# Patient Record
Sex: MALE | Race: WHITE | Employment: OTHER | URBAN - METROPOLITAN AREA
[De-identification: names, ages, dates, MRNs, and addresses within clinical notes are randomized per-mention and may not be internally consistent; named-entity substitution may affect disease eponyms.]

---

## 2017-05-17 ENCOUNTER — TRANSFERRED RECORDS (OUTPATIENT)
Dept: HEALTH INFORMATION MANAGEMENT | Facility: CLINIC | Age: 22
End: 2017-05-17

## 2017-05-23 ENCOUNTER — ANESTHESIA EVENT (OUTPATIENT)
Dept: SURGERY | Facility: CLINIC | Age: 22
End: 2017-05-23
Payer: MEDICAID

## 2017-05-23 ENCOUNTER — ANESTHESIA (OUTPATIENT)
Dept: SURGERY | Facility: CLINIC | Age: 22
End: 2017-05-23
Payer: MEDICAID

## 2017-05-23 ENCOUNTER — HOSPITAL ENCOUNTER (OUTPATIENT)
Facility: CLINIC | Age: 22
Discharge: HOME | End: 2017-05-23
Attending: PHYSICAL MEDICINE & REHABILITATION | Admitting: PHYSICAL MEDICINE & REHABILITATION
Payer: MEDICAID

## 2017-05-23 VITALS
DIASTOLIC BLOOD PRESSURE: 79 MMHG | HEIGHT: 64 IN | OXYGEN SATURATION: 94 % | WEIGHT: 118 LBS | BODY MASS INDEX: 20.14 KG/M2 | SYSTOLIC BLOOD PRESSURE: 114 MMHG | TEMPERATURE: 97.2 F | RESPIRATION RATE: 16 BRPM

## 2017-05-23 PROCEDURE — 71000012 ZZH RECOVERY PHASE 1 LEVEL 1 FIRST HR: Performed by: PHYSICAL MEDICINE & REHABILITATION

## 2017-05-23 PROCEDURE — 36000065 ZZH SURGERY LEVEL 4 W FLUORO 1ST 30 MIN: Performed by: PHYSICAL MEDICINE & REHABILITATION

## 2017-05-23 PROCEDURE — 25000125 ZZHC RX 250: Performed by: PHYSICAL MEDICINE & REHABILITATION

## 2017-05-23 PROCEDURE — 27210794 ZZH OR GENERAL SUPPLY STERILE: Performed by: PHYSICAL MEDICINE & REHABILITATION

## 2017-05-23 PROCEDURE — 37000009 ZZH ANESTHESIA TECHNICAL FEE, EACH ADDTL 15 MIN: Performed by: PHYSICAL MEDICINE & REHABILITATION

## 2017-05-23 PROCEDURE — 40000170 ZZH STATISTIC PRE-PROCEDURE ASSESSMENT II: Performed by: PHYSICAL MEDICINE & REHABILITATION

## 2017-05-23 PROCEDURE — 36000063 ZZH SURGERY LEVEL 4 EA 15 ADDTL MIN: Performed by: PHYSICAL MEDICINE & REHABILITATION

## 2017-05-23 PROCEDURE — 25000125 ZZHC RX 250: Performed by: ANESTHESIOLOGY

## 2017-05-23 PROCEDURE — C1772 INFUSION PUMP, PROGRAMMABLE: HCPCS | Performed by: PHYSICAL MEDICINE & REHABILITATION

## 2017-05-23 PROCEDURE — S0077 INJECTION, CLINDAMYCIN PHOSP: HCPCS | Performed by: NURSE ANESTHETIST, CERTIFIED REGISTERED

## 2017-05-23 PROCEDURE — 25000128 H RX IP 250 OP 636: Performed by: NURSE ANESTHETIST, CERTIFIED REGISTERED

## 2017-05-23 PROCEDURE — 25000128 H RX IP 250 OP 636: Performed by: PHYSICAL MEDICINE & REHABILITATION

## 2017-05-23 PROCEDURE — 25000125 ZZHC RX 250: Performed by: NURSE ANESTHETIST, CERTIFIED REGISTERED

## 2017-05-23 PROCEDURE — 37000008 ZZH ANESTHESIA TECHNICAL FEE, 1ST 30 MIN: Performed by: PHYSICAL MEDICINE & REHABILITATION

## 2017-05-23 PROCEDURE — 27810169 ZZH OR IMPLANT GENERAL: Performed by: PHYSICAL MEDICINE & REHABILITATION

## 2017-05-23 PROCEDURE — 71000027 ZZH RECOVERY PHASE 2 EACH 15 MINS: Performed by: PHYSICAL MEDICINE & REHABILITATION

## 2017-05-23 DEVICE — PUMP SYNCHROMED II 20ML 8637-20: Type: IMPLANTABLE DEVICE | Site: ABDOMEN | Status: FUNCTIONAL

## 2017-05-23 DEVICE — IMP ENVELOPE MEDT TYRX ABS ANTIBACTERIAL LG NMRM6133: Type: IMPLANTABLE DEVICE | Site: ABDOMEN | Status: FUNCTIONAL

## 2017-05-23 RX ORDER — PREDNISOLONE 15 MG/5 ML
3.3-1 SOLUTION, ORAL ORAL 2 TIMES DAILY PRN
COMMUNITY

## 2017-05-23 RX ORDER — PREDNISOLONE 15 MG/5 ML
3.3 SOLUTION, ORAL ORAL DAILY
COMMUNITY

## 2017-05-23 RX ORDER — CETIRIZINE HYDROCHLORIDE 5 MG/1
10 TABLET ORAL AT BEDTIME
COMMUNITY

## 2017-05-23 RX ORDER — ONDANSETRON 2 MG/ML
4 INJECTION INTRAMUSCULAR; INTRAVENOUS EVERY 30 MIN PRN
Status: DISCONTINUED | OUTPATIENT
Start: 2017-05-23 | End: 2017-05-23 | Stop reason: HOSPADM

## 2017-05-23 RX ORDER — MUPIROCIN 20 MG/G
OINTMENT TOPICAL 2 TIMES DAILY PRN
COMMUNITY

## 2017-05-23 RX ORDER — PREDNISOLONE 15 MG/5 ML
10-20 SOLUTION, ORAL ORAL DAILY PRN
Status: ON HOLD | COMMUNITY
End: 2017-05-23

## 2017-05-23 RX ORDER — CYPROHEPTADINE HYDROCHLORIDE 2 MG/5ML
6 SOLUTION ORAL AT BEDTIME
COMMUNITY

## 2017-05-23 RX ORDER — CYPROHEPTADINE HYDROCHLORIDE 2 MG/5ML
4 SOLUTION ORAL 2 TIMES DAILY
COMMUNITY

## 2017-05-23 RX ORDER — NYSTATIN 100000 [USP'U]/G
POWDER TOPICAL DAILY PRN
COMMUNITY

## 2017-05-23 RX ORDER — BISACODYL 10 MG
10 SUPPOSITORY, RECTAL RECTAL DAILY PRN
COMMUNITY

## 2017-05-23 RX ORDER — ONDANSETRON 4 MG/1
4 TABLET, ORALLY DISINTEGRATING ORAL EVERY 30 MIN PRN
Status: DISCONTINUED | OUTPATIENT
Start: 2017-05-23 | End: 2017-05-23 | Stop reason: HOSPADM

## 2017-05-23 RX ORDER — KETOCONAZOLE 20 MG/ML
SHAMPOO TOPICAL
COMMUNITY

## 2017-05-23 RX ORDER — GABAPENTIN 250 MG/5ML
750 SOLUTION ORAL 2 TIMES DAILY
COMMUNITY

## 2017-05-23 RX ORDER — HYDROCORTISONE 25 MG/G
OINTMENT TOPICAL 2 TIMES DAILY PRN
COMMUNITY

## 2017-05-23 RX ORDER — CLINDAMYCIN PHOSPHATE 600 MG/50ML
INJECTION, SOLUTION INTRAVENOUS PRN
Status: DISCONTINUED | OUTPATIENT
Start: 2017-05-23 | End: 2017-05-23

## 2017-05-23 RX ORDER — IBUPROFEN 100 MG/5ML
400 SUSPENSION, ORAL (FINAL DOSE FORM) ORAL EVERY 6 HOURS PRN
COMMUNITY

## 2017-05-23 RX ORDER — BENZOCAINE/MENTHOL 6 MG-10 MG
LOZENGE MUCOUS MEMBRANE 3 TIMES DAILY PRN
Status: ON HOLD | COMMUNITY
End: 2017-05-23

## 2017-05-23 RX ORDER — SODIUM CHLORIDE, SODIUM LACTATE, POTASSIUM CHLORIDE, CALCIUM CHLORIDE 600; 310; 30; 20 MG/100ML; MG/100ML; MG/100ML; MG/100ML
INJECTION, SOLUTION INTRAVENOUS CONTINUOUS PRN
Status: DISCONTINUED | OUTPATIENT
Start: 2017-05-23 | End: 2017-05-23

## 2017-05-23 RX ORDER — DIPHENHYDRAMINE HCL 25 MG
25-50 CAPSULE ORAL EVERY 6 HOURS PRN
Status: DISCONTINUED | OUTPATIENT
Start: 2017-05-23 | End: 2017-05-23 | Stop reason: HOSPADM

## 2017-05-23 RX ORDER — ACETAMINOPHEN 10 MG/ML
1000 INJECTION, SOLUTION INTRAVENOUS ONCE
Status: COMPLETED | OUTPATIENT
Start: 2017-05-23 | End: 2017-05-23

## 2017-05-23 RX ORDER — DIAZEPAM 10 MG/2G
10 GEL RECTAL
COMMUNITY

## 2017-05-23 RX ORDER — ALBUTEROL SULFATE 90 UG/1
2-4 AEROSOL, METERED RESPIRATORY (INHALATION) EVERY 4 HOURS PRN
COMMUNITY

## 2017-05-23 RX ORDER — PROCHLORPERAZINE 25 MG
25 SUPPOSITORY, RECTAL RECTAL EVERY 12 HOURS PRN
Status: DISCONTINUED | OUTPATIENT
Start: 2017-05-23 | End: 2017-05-23 | Stop reason: HOSPADM

## 2017-05-23 RX ORDER — POLYETHYLENE GLYCOL 3350 17 G/17G
17 POWDER, FOR SOLUTION ORAL DAILY
COMMUNITY

## 2017-05-23 RX ORDER — NYSTATIN 100000 U/G
OINTMENT TOPICAL 2 TIMES DAILY PRN
COMMUNITY

## 2017-05-23 RX ORDER — MAGNESIUM HYDROXIDE/ALUMINUM HYDROXICE/SIMETHICONE 120; 1200; 1200 MG/30ML; MG/30ML; MG/30ML
15 SUSPENSION ORAL 4 TIMES DAILY PRN
COMMUNITY

## 2017-05-23 RX ORDER — LIDOCAINE 40 MG/G
CREAM TOPICAL
Status: DISCONTINUED | OUTPATIENT
Start: 2017-05-23 | End: 2017-05-23 | Stop reason: HOSPADM

## 2017-05-23 RX ORDER — SODIUM CHLORIDE, SODIUM LACTATE, POTASSIUM CHLORIDE, CALCIUM CHLORIDE 600; 310; 30; 20 MG/100ML; MG/100ML; MG/100ML; MG/100ML
INJECTION, SOLUTION INTRAVENOUS CONTINUOUS
Status: DISCONTINUED | OUTPATIENT
Start: 2017-05-23 | End: 2017-05-23 | Stop reason: HOSPADM

## 2017-05-23 RX ORDER — NYSTATIN 100000 U/G
CREAM TOPICAL 2 TIMES DAILY PRN
Status: ON HOLD | COMMUNITY
End: 2017-05-23

## 2017-05-23 RX ORDER — SIMETHICONE 40MG/0.6ML
96 SUSPENSION, DROPS(FINAL DOSAGE FORM)(ML) ORAL 3 TIMES DAILY
Status: ON HOLD | COMMUNITY
End: 2017-05-23

## 2017-05-23 RX ORDER — PROCHLORPERAZINE MALEATE 5 MG
5-10 TABLET ORAL EVERY 6 HOURS PRN
Status: DISCONTINUED | OUTPATIENT
Start: 2017-05-23 | End: 2017-05-23 | Stop reason: HOSPADM

## 2017-05-23 RX ORDER — SODIUM CHLORIDE FOR INHALATION 0.9 %
3 VIAL, NEBULIZER (ML) INHALATION 3 TIMES DAILY PRN
COMMUNITY

## 2017-05-23 RX ORDER — NALOXONE HYDROCHLORIDE 0.4 MG/ML
.1-.4 INJECTION, SOLUTION INTRAMUSCULAR; INTRAVENOUS; SUBCUTANEOUS
Status: DISCONTINUED | OUTPATIENT
Start: 2017-05-23 | End: 2017-05-23 | Stop reason: HOSPADM

## 2017-05-23 RX ORDER — ETHYL ALCOHOL 700 MG/ML
GEL TOPICAL
COMMUNITY

## 2017-05-23 RX ORDER — GABAPENTIN 250 MG/5ML
900 SOLUTION ORAL AT BEDTIME
COMMUNITY

## 2017-05-23 RX ORDER — METOCLOPRAMIDE HYDROCHLORIDE 5 MG/ML
10 INJECTION INTRAMUSCULAR; INTRAVENOUS EVERY 6 HOURS
Status: DISCONTINUED | OUTPATIENT
Start: 2017-05-23 | End: 2017-05-23 | Stop reason: HOSPADM

## 2017-05-23 RX ORDER — DIPHENHYDRAMINE HYDROCHLORIDE 50 MG/ML
25-50 INJECTION INTRAMUSCULAR; INTRAVENOUS EVERY 6 HOURS PRN
Status: DISCONTINUED | OUTPATIENT
Start: 2017-05-23 | End: 2017-05-23 | Stop reason: HOSPADM

## 2017-05-23 RX ORDER — ALBUTEROL SULFATE 0.83 MG/ML
1 SOLUTION RESPIRATORY (INHALATION) 2 TIMES DAILY
Status: ON HOLD | COMMUNITY
End: 2017-05-23

## 2017-05-23 RX ORDER — ALBUTEROL SULFATE 0.83 MG/ML
1 SOLUTION RESPIRATORY (INHALATION)
COMMUNITY

## 2017-05-23 RX ADMIN — DEXMEDETOMIDINE HYDROCHLORIDE 4 MCG: 100 INJECTION, SOLUTION INTRAVENOUS at 12:32

## 2017-05-23 RX ADMIN — DEXMEDETOMIDINE HYDROCHLORIDE 4 MCG: 100 INJECTION, SOLUTION INTRAVENOUS at 12:25

## 2017-05-23 RX ADMIN — DEXMEDETOMIDINE HYDROCHLORIDE 4 MCG: 100 INJECTION, SOLUTION INTRAVENOUS at 12:51

## 2017-05-23 RX ADMIN — SODIUM CHLORIDE, POTASSIUM CHLORIDE, SODIUM LACTATE AND CALCIUM CHLORIDE: 600; 310; 30; 20 INJECTION, SOLUTION INTRAVENOUS at 12:15

## 2017-05-23 RX ADMIN — CLINDAMYCIN PHOSPHATE 600 MG: 12 INJECTION, SOLUTION INTRAVENOUS at 12:27

## 2017-05-23 RX ADMIN — ACETAMINOPHEN 1000 MG: 10 INJECTION, SOLUTION INTRAVENOUS at 13:35

## 2017-05-23 NOTE — IP AVS SNAPSHOT
MRN:[id number]                      After Visit Summary   5/23/2017    Uziel Rashid    MRN: [id number]           Thank you!     Thank you for choosing [facility] for your care. Our goal is always to provide you with excellent care. Hearing back from our patients is one way we can continue to improve our services. Please take a few minutes to complete the written survey that you may receive in the mail after you visit with us. Thank you!        Patient Information     Date Of Birth          1995        About your hospital stay     You were admitted on:  May 23, 2017 You last received care in the:  [facility] Same Day Surgery    You were discharged on:  May 23, 2017       Who to Call     For medical emergencies, please call 911.  For non-urgent questions about your medical care, please call your primary care provider or clinic, [phone]  For questions related to your surgery, please call your surgery clinic        Attending Provider     Provider Trip    Will, Jim ANDERSON MD [facility]       Primary Care Provider Office Phone # Fax #    [facility] [phone]       [address] [place] [address]  [facility] [id number]        Further instructions from your care team       Same Day Surgery Discharge Instructions for  Sedation and General Anesthesia       It's not unusual to feel dizzy, light-headed or faint for up to 24 hours after surgery or while taking pain medication.  If you have these symptoms: sit for a few minutes before standing and have someone assist you when you get up to walk or use the bathroom.      You should rest and relax for the next 24 hours. We recommend you make arrangements to have an adult stay with you for at least 24 hours after your discharge.  Avoid hazardous and strenuous activity.      DO NOT DRIVE any vehicle or operate mechanical equipment for 24 hours following the end of your surgery.  Even though you may feel normal, your  reactions may be affected by the medication you have received.      Do not drink alcoholic beverages for 24 hours following surgery.       Slowly progress to your regular diet as you feel able. It's not unusual to feel nauseated and/or vomit after receiving anesthesia.  If you develop these symptoms, drink clear liquids (apple juice, ginger ale, broth, 7-up, etc. ) until you feel better.  If your nausea and vomiting persists for 24 hours, please notify your surgeon.        All narcotic pain medications, along with inactivity and anesthesia, can cause constipation. Drinking plenty of liquids and increasing fiber intake will help.      For any questions of a medical nature, call your surgeon.      Do not make important decisions for 24 hours.      If you had general anesthesia, you may have a sore throat for a couple of days related to the breathing tube used during surgery.  You may use Cepacol lozenges to help with this discomfort.  If it worsens or if you develop a fever, contact your surgeon.       If you feel your pain is not well managed with the pain medications prescribed by your surgeon, please contact your surgeon's office to let them know so they can address your concerns.           [facility]  Opioid Pain Pump Implant  Discharge Instructions    Diet and Medications   * Continue with your regular diet   * Resume your regular pain medication as written in your medication discharge sheet   * You may notice a need to decrease your pain medications   * You will be taking an oral antibiotic (Keflex) for 10 days after the Implant   * DO NOT drink alcoholic beverage    Activity and Lead Site Care   * Please resume light activities as tolerated    Site Care   * Check your procedure site daily.  Keep sites clean and dry.  Leave the dressings in place.   * No shower, tub bath, swimming or whirlpools while catheter in place.  Sponge bath only.    Call Dr. Nye if you develop   *  Sign of an infection:  "fevers, chills, increased pain, drainage, redness and swelling from the insertion site   *  Headache persisting for more than 48 hours   * Unusual changes in or stopping of sensation   * Painful sensations:  Call the Doctor's Office [phone] or call Dr. Nye on his cell phone after hours [phone]    Emergency situations-Go to the Emergency Department or call 911:   *  Sudden severe back pain   *  Sudden onset of leg weakness and spasms   *  Loss of bladder control and/or bowel function    ___ [facility] [phone]  ___ [facility] [phone]    Pending Results     No orders found from 2017 to 2017.            Admission Information     Date & Time Provider Department Dept. Phone    2017 Jim Nye MD [facility] Same Day Surgery [phone]      Your Vitals Were     Blood Pressure Temperature Respirations Height Weight Pulse Oximetry    114/76 97.2  F (36.2  C) (Temporal) 16 1.626 m (5' 4\") 53.5 kg (118 lb) 95%    BMI (Body Mass Index)                   20.25 kg/m2           MyChart Information     [facility] lets you send messages to your doctor, view your test results, renew your prescriptions, schedule appointments and more. To sign up, go to [email or web address] Click on \"Log in\" on the left side of the screen, which will take you to the Welcome page. Then click on \"Sign up Now\" on the right side of the page.     You will be asked to enter the access code listed below, as well as some personal information. Please follow the directions to create your username and password.     Your access code is: [email or web address]  Expires: 2017  1:39 PM     Your access code will  in 90 days. If you need help or a new code, please call your [facility] clinic or [phone].        Care EveryWhere ID     This is your Care EveryWhere ID. This could be used by other organizations to access your [facility] medical records  [id number]           Review of " your medicines      UNREVIEWED medicines. Ask your doctor about these medicines        Dose / Directions    acetaminophen 32 mg/mL solution   Commonly known as:  TYLENOL        Dose:  576 mg   576 mg by Per G Tube route every 4 hours as needed for fever or mild pain   Refills:  0       ACIDOPHILUS LACTOBACILLUS PO        Dose:  1 tablet   1 tablet by Gastric Tube route 2 times daily   Refills:  0       * albuterol (2.5 MG/3ML) 0.083% neb solution        Dose:  1 vial   Take 1 vial by nebulization every 3 hours as needed for shortness of breath / dyspnea or wheezing   Refills:  0       * albuterol 108 (90 BASE) MCG/ACT Inhaler   Commonly known as:  PROAIR HFA/PROVENTIL HFA/VENTOLIN HFA        Dose:  2-4 puff   Inhale 2-4 puffs into the lungs every 4 hours as needed for shortness of breath / dyspnea or wheezing   Refills:  0       alum & mag hydroxide-simethicone 200-200-20 MG/5ML Susp suspension   Commonly known as:  MYLANTA/MAALOX        Dose:  15 mL   15 mLs by Per G Tube route 4 times daily as needed (GI Distress)   Refills:  0       * BACLOFEN IN INTERNAL INTRATHECAL PUMP        by Intrathecal route continuous prn 599.2 UG/ 24 Hours   Refills:  0       * BACLOFEN PO        Dose:  20 mg   20 mg by Gastric Tube route every 6 hours as needed (If Baclofen Pump Fails)   Refills:  0       beclomethasone 40 MCG/ACT Inhaler   Commonly known as:  QVAR        Dose:  1 puff   Inhale 1 puff into the lungs 2 times daily   Refills:  0       * BISACODYL PO        Dose:  20 mg   20 mg by Per J Tube route daily as needed (4 x 5 mg = 20 mg dose)   Refills:  0       * bisacodyl 10 MG Suppository   Commonly known as:  DULCOLAX        Dose:  10 mg   Place 10 mg rectally daily as needed for constipation   Refills:  0       BROMOCRIPTINE MESYLATE PO        Dose:  5 mg   5 mg by Gastric Tube route 3 times daily (2 x 2.5 mg = 5 mg dose)   Refills:  0       CENTRUM KIDS COMPLETE PO        Dose:  1 chew tab   1 chew tab by Gastric Tube  route daily   Refills:  0       Cetirizine HCl 5 MG/5ML Soln        Dose:  10 mL   10 mLs by Gastric Tube route At Bedtime   Refills:  0       * cyproheptadine 2 MG/5ML syrup        Dose:  4 mg   Take 4 mg by mouth 2 times daily 0900 and 1400   Refills:  0       * cyproheptadine 2 MG/5ML syrup        Dose:  6 mg   Take 6 mg by mouth At Bedtime   Refills:  0       diazepam 10 MG Gel rectal kit   Commonly known as:  DIASTAT ACUDIAL        Dose:  10 mg   Place 10 mg rectally once as needed for seizures   Refills:  0       docusate sodium 283 MG enema   Commonly known as:  ENEMEEZ        Dose:  1-2 enema   Place 1-2 enemas rectally daily as needed for constipation   Refills:  0       EASY FIBER PO        Dose:  2 tsp.   2 tsp. by Per G Tube route daily (with 300 ml water)   Refills:  0       ergocalciferol 8000 UNIT/ML drops   Commonly known as:  DRISDOL        Dose:  8000 Units   8,000 Units by Per G Tube route once a week (Monday)   Refills:  0       EYE LUBRICANT Oint        Place into both eyes every 3 hours as needed (Dry Eyes)   Refills:  0       FLEET ENEMA RE        Place rectally daily as needed (if Mini Doesn't work)   Refills:  0       * gabapentin 250 MG/5ML solution   Commonly known as:  NEURONTIN        Dose:  750 mg   750 mg by Per G Tube route 2 times daily At 0900 and 1400   Refills:  0       * gabapentin 250 MG/5ML solution   Commonly known as:  NEURONTIN        Dose:  900 mg   Take 900 mg by mouth At Bedtime At 2000   Refills:  0       guaiFENesin 100 MG/5ML Syrp   Commonly known as:  ROBITUSSIN        Dose:  5 mL   Take 5 mLs by mouth 4 times daily as needed for cough   Refills:  0       hydrocortisone 2.5 % ointment        Apply topically 2 times daily as needed for rash   Refills:  0       ibuprofen 100 MG/5ML suspension   Commonly known as:  ADVIL/MOTRIN        Dose:  400 mg   Take 400 mg by mouth every 6 hours as needed for pain (Discomfort)   Refills:  0       ketoconazole 2 % shampoo   Commonly  known as:  NIZORAL        Apply topically every 72 hours as needed for itching or irritation   Refills:  0       LAMOTRIGINE PO        Dose:  300 mg   300 mg by Gastric Tube route 2 times daily (2 x 150 mg tablet = 300 mg dose)   Refills:  0       LANSOPRAZOLE PO        Dose:  30 mg   30 mg by Gastric Tube route 2 times daily   Refills:  0       methylPREDNISolone sodium succinate 40 mg/mL   Commonly known as:  solu-MEDROL        Dose:  40 mg   Inject 40 mg into the vein daily as needed (stress dosing)   Refills:  0       mupirocin 2 % ointment   Commonly known as:  BACTROBAN        Apply topically 2 times daily as needed Apply and gently massage into affected area around G-Tube site   Refills:  0       * NONFORMULARY        Dose:  1.2 mL   1.2 mLs by Gastric Tube route 3 times daily SIMETHICONE 80mcg/ml   Refills:  0       * NONFORMULARY        Dose:  30 mL   30 mLs by Bladder Instillation route 2 times daily as needed GENTAMICIN BLADDER IRRIGATION SOLUTION 480mg/1ml 1 L Normal Saline Cath into empty bladder first.  Instill 30 ml Gentamicin solution into bladder.  Leave in bladder until next cath.   Refills:  0       * nystatin ointment   Commonly known as:  MYCOSTATIN        Apply topically 2 times daily as needed (Rash)   Refills:  0       * nystatin 251153 UNIT/GM Powd   Commonly known as:  MYCOSTATIN        Apply topically daily as needed   Refills:  0       polyethylene glycol powder   Commonly known as:  MIRALAX/GLYCOLAX        Dose:  17 g   17 g by Per G Tube route daily (mix with liquid)   Refills:  0       * prednisoLONE 15 MG/5ML solution   Commonly known as:  ORAPRED/PRELONE        Dose:  3.3 mL   3.3 mLs by Gastric Tube route daily   Refills:  0       * prednisoLONE 15 MG/5ML solution   Commonly known as:  ORAPRED/PRELONE        Dose:  3.3-10 mL   Take 3.3-10 mLs by mouth 2 times daily as needed (mild to sevre stress) for 1-5 days -Red Zone of Resp Control Plan   Refills:  0       PULMICORT IN         Inhale into the lungs daily as needed   Refills:  0       sennosides 8.8 MG/5ML syrup   Commonly known as:  SENOKOT        Dose:  17.6 mg   17.6 mg by Gastric Tube route See Admin Instructions Twice daily until regular, then once daily, hold if loose   Refills:  0       sodium chloride 0.9 % neb solution        Dose:  3 mL   Take 3 mLs by nebulization 3 times daily as needed for wheezing   Refills:  0       SODIUM CHLORIDE NA        Dose:  2 spray   Spray 2 sprays in nostril 2 times daily   Refills:  0       * TOPIRAMATE PO        Dose:  150 mg   150 mg by Gastric Tube route every morning (1.5 x 100 mg tablet = 150 mg dose)   Refills:  0       * TOPIRAMATE PO        Dose:  200 mg   200 mg by Gastric Tube route At Bedtime (2 x 100 mg tablet = 200 mg dose)   Refills:  0       * Notice:  This list has 18 medication(s) that are the same as other medications prescribed for you. Read the directions carefully, and ask your doctor or other care provider to review them with you.             Protect others around you: Learn how to safely use, store and throw away your medicines at www.disposemymeds.org.             Medication List: This is a list of all your medications and when to take them. Check marks below indicate your daily home schedule. Keep this list as a reference.      Medications           Morning Afternoon Evening Bedtime As Needed    acetaminophen 32 mg/mL solution   Commonly known as:  TYLENOL   576 mg by Per G Tube route every 4 hours as needed for fever or mild pain                                ACIDOPHILUS LACTOBACILLUS PO   1 tablet by Gastric Tube route 2 times daily                                * albuterol (2.5 MG/3ML) 0.083% neb solution   Take 1 vial by nebulization every 3 hours as needed for shortness of breath / dyspnea or wheezing                                * albuterol 108 (90 BASE) MCG/ACT Inhaler   Commonly known as:  PROAIR HFA/PROVENTIL HFA/VENTOLIN HFA   Inhale 2-4 puffs into the lungs  every 4 hours as needed for shortness of breath / dyspnea or wheezing                                alum & mag hydroxide-simethicone 200-200-20 MG/5ML Susp suspension   Commonly known as:  MYLANTA/MAALOX   15 mLs by Per G Tube route 4 times daily as needed (GI Distress)                                * BACLOFEN IN INTERNAL INTRATHECAL PUMP   by Intrathecal route continuous prn 599.2 UG/ 24 Hours                                * BACLOFEN PO   20 mg by Gastric Tube route every 6 hours as needed (If Baclofen Pump Fails)                                beclomethasone 40 MCG/ACT Inhaler   Commonly known as:  QVAR   Inhale 1 puff into the lungs 2 times daily                                * BISACODYL PO   20 mg by Per J Tube route daily as needed (4 x 5 mg = 20 mg dose)                                * bisacodyl 10 MG Suppository   Commonly known as:  DULCOLAX   Place 10 mg rectally daily as needed for constipation                                BROMOCRIPTINE MESYLATE PO   5 mg by Gastric Tube route 3 times daily (2 x 2.5 mg = 5 mg dose)                                CENTRUM KIDS COMPLETE PO   1 chew tab by Gastric Tube route daily                                Cetirizine HCl 5 MG/5ML Soln   10 mLs by Gastric Tube route At Bedtime                                * cyproheptadine 2 MG/5ML syrup   Take 4 mg by mouth 2 times daily 0900 and 1400                                * cyproheptadine 2 MG/5ML syrup   Take 6 mg by mouth At Bedtime                                diazepam 10 MG Gel rectal kit   Commonly known as:  DIASTAT ACUDIAL   Place 10 mg rectally once as needed for seizures                                docusate sodium 283 MG enema   Commonly known as:  ENEMEEZ   Place 1-2 enemas rectally daily as needed for constipation                                EASY FIBER PO   2 tsp. by Per G Tube route daily (with 300 ml water)                                ergocalciferol 8000 UNIT/ML drops   Commonly known as:  DRISDOL    8,000 Units by Per G Tube route once a week (Monday)                                EYE LUBRICANT Oint   Place into both eyes every 3 hours as needed (Dry Eyes)                                FLEET ENEMA RE   Place rectally daily as needed (if Mini Doesn't work)                                * gabapentin 250 MG/5ML solution   Commonly known as:  NEURONTIN   750 mg by Per G Tube route 2 times daily At 0900 and 1400                                * gabapentin 250 MG/5ML solution   Commonly known as:  NEURONTIN   Take 900 mg by mouth At Bedtime At 2000                                guaiFENesin 100 MG/5ML Syrp   Commonly known as:  ROBITUSSIN   Take 5 mLs by mouth 4 times daily as needed for cough                                hydrocortisone 2.5 % ointment   Apply topically 2 times daily as needed for rash                                ibuprofen 100 MG/5ML suspension   Commonly known as:  ADVIL/MOTRIN   Take 400 mg by mouth every 6 hours as needed for pain (Discomfort)                                ketoconazole 2 % shampoo   Commonly known as:  NIZORAL   Apply topically every 72 hours as needed for itching or irritation                                LAMOTRIGINE PO   300 mg by Gastric Tube route 2 times daily (2 x 150 mg tablet = 300 mg dose)                                LANSOPRAZOLE PO   30 mg by Gastric Tube route 2 times daily                                methylPREDNISolone sodium succinate 40 mg/mL   Commonly known as:  solu-MEDROL   Inject 40 mg into the vein daily as needed (stress dosing)                                mupirocin 2 % ointment   Commonly known as:  BACTROBAN   Apply topically 2 times daily as needed Apply and gently massage into affected area around G-Tube site                                * NONFORMULARY   1.2 mLs by Gastric Tube route 3 times daily SIMETHICONE 80mcg/ml                                * NONFORMULARY   30 mLs by Bladder Instillation route 2 times daily as needed  GENTAMICIN BLADDER IRRIGATION SOLUTION 480mg/1ml 1 L Normal Saline Cath into empty bladder first.  Instill 30 ml Gentamicin solution into bladder.  Leave in bladder until next cath.                                * nystatin ointment   Commonly known as:  MYCOSTATIN   Apply topically 2 times daily as needed (Rash)                                * nystatin 155248 UNIT/GM Powd   Commonly known as:  MYCOSTATIN   Apply topically daily as needed                                polyethylene glycol powder   Commonly known as:  MIRALAX/GLYCOLAX   17 g by Per G Tube route daily (mix with liquid)                                * prednisoLONE 15 MG/5ML solution   Commonly known as:  ORAPRED/PRELONE   3.3 mLs by Gastric Tube route daily                                * prednisoLONE 15 MG/5ML solution   Commonly known as:  ORAPRED/PRELONE   Take 3.3-10 mLs by mouth 2 times daily as needed (mild to sevre stress) for 1-5 days -Red Zone of Resp Control Plan                                PULMICORT IN   Inhale into the lungs daily as needed                                sennosides 8.8 MG/5ML syrup   Commonly known as:  SENOKOT   17.6 mg by Gastric Tube route See Admin Instructions Twice daily until regular, then once daily, hold if loose                                sodium chloride 0.9 % neb solution   Take 3 mLs by nebulization 3 times daily as needed for wheezing                                SODIUM CHLORIDE NA   Spray 2 sprays in nostril 2 times daily                                * TOPIRAMATE PO   150 mg by Gastric Tube route every morning (1.5 x 100 mg tablet = 150 mg dose)                                * TOPIRAMATE PO   200 mg by Gastric Tube route At Bedtime (2 x 100 mg tablet = 200 mg dose)                                * Notice:  This list has 18 medication(s) that are the same as other medications prescribed for you. Read the directions carefully, and ask your doctor or other care provider to review them with you.

## 2017-05-23 NOTE — ANESTHESIA POSTPROCEDURE EVALUATION
Patient: Uziel Rashid    Procedure(s):  PAIN PUMP REPLACEMENT (WITH MEDTRONIC) - Wound Class: I-Clean    Diagnosis:SPASTIC QUADRAPALEGIC CEREBRAL PALSY  Diagnosis Additional Information: No value filed.    Anesthesia Type:  MAC    Note:  Anesthesia Post Evaluation    Patient location during evaluation: PACU  Patient participation: Able to fully participate in evaluation  Level of consciousness: awake  Pain management: adequate  Airway patency: patent  Cardiovascular status: acceptable  Respiratory status: acceptable  Hydration status: acceptable  PONV: none     Anesthetic complications: None          Last vitals:  Vitals:    05/23/17 1345 05/23/17 1400 05/23/17 1415   BP: 118/80 114/76 114/79   Resp: 14 16 16   Temp:      SpO2: 97% 95% 94%         Electronically Signed By: Grant Castro MD  May 23, 2017  3:20 PM

## 2017-05-23 NOTE — IP AVS SNAPSHOT
[facility] Day [facility]    [address]    [place] [address]    Phone:  [phone]    Fax:  [phone]                                       After Visit Summary   5/23/2017    Uziel Rashid    MRN: [id number]           After Visit Summary Signature Page     I have received my discharge instructions, and my questions have been answered. I have discussed any challenges I see with this plan with the nurse or doctor.    ..........................................................................................................................................  Patient/Patient Representative Signature      ..........................................................................................................................................  Patient Representative Print Name and Relationship to Patient    ..................................................               ................................................  Date                                            Time    ..........................................................................................................................................  Reviewed by Signature/Title    ...................................................              ..............................................  Date                                                            Time

## 2017-05-23 NOTE — DISCHARGE INSTRUCTIONS
Same Day Surgery Discharge Instructions for  Sedation and General Anesthesia       It's not unusual to feel dizzy, light-headed or faint for up to 24 hours after surgery or while taking pain medication.  If you have these symptoms: sit for a few minutes before standing and have someone assist you when you get up to walk or use the bathroom.      You should rest and relax for the next 24 hours. We recommend you make arrangements to have an adult stay with you for at least 24 hours after your discharge.  Avoid hazardous and strenuous activity.      DO NOT DRIVE any vehicle or operate mechanical equipment for 24 hours following the end of your surgery.  Even though you may feel normal, your reactions may be affected by the medication you have received.      Do not drink alcoholic beverages for 24 hours following surgery.       Slowly progress to your regular diet as you feel able. It's not unusual to feel nauseated and/or vomit after receiving anesthesia.  If you develop these symptoms, drink clear liquids (apple juice, ginger ale, broth, 7-up, etc. ) until you feel better.  If your nausea and vomiting persists for 24 hours, please notify your surgeon.        All narcotic pain medications, along with inactivity and anesthesia, can cause constipation. Drinking plenty of liquids and increasing fiber intake will help.      For any questions of a medical nature, call your surgeon.      Do not make important decisions for 24 hours.      If you had general anesthesia, you may have a sore throat for a couple of days related to the breathing tube used during surgery.  You may use Cepacol lozenges to help with this discomfort.  If it worsens or if you develop a fever, contact your surgeon.       If you feel your pain is not well managed with the pain medications prescribed by your surgeon, please contact your surgeon's office to let them know so they can address your concerns.           [facility]  Opioid Pain Pump  Implant  Discharge Instructions    Diet and Medications   * Continue with your regular diet   * Resume your regular pain medication as written in your medication discharge sheet   * You may notice a need to decrease your pain medications   * You will be taking an oral antibiotic (Keflex) for 10 days after the Implant   * DO NOT drink alcoholic beverage    Activity and Lead Site Care   * Please resume light activities as tolerated    Site Care   * Check your procedure site daily.  Keep sites clean and dry.  Leave the dressings in place.   * No shower, tub bath, swimming or whirlpools while catheter in place.  Sponge bath only.    Call Dr. Nye if you develop   *  Sign of an infection: fevers, chills, increased pain, drainage, redness and swelling from the insertion site   *  Headache persisting for more than 48 hours   * Unusual changes in or stopping of sensation   * Painful sensations:  Call the Doctor's Office [phone] or call Dr. Nye on his cell phone after hours [phone]    Emergency situations-Go to the Emergency Department or call 911:   *  Sudden severe back pain   *  Sudden onset of leg weakness and spasms   *  Loss of bladder control and/or bowel function    ___ [facility] [phone]  ___ [facility] [phone]

## 2017-05-23 NOTE — ANESTHESIA PREPROCEDURE EVALUATION
Anesthesia Evaluation     . Pt has had prior anesthetic. Type: General    No history of anesthetic complications          ROS/MED HX    ENT/Pulmonary:       Neurologic: Comment: Spastic quadriplegia  Anoxic brain injury  Non communicative   Shunt  Seizures    (+)other neuro     Cardiovascular:  - neg cardiovascular ROS       METS/Exercise Tolerance:     Hematologic:         Musculoskeletal:         GI/Hepatic: Comment: S/p nissen fundoplication  G tube    (+) GERD Asymptomatic on medication,       Renal/Genitourinary:      (-) renal disease   Endo:         Psychiatric:         Infectious Disease:         Malignancy:         Other:                     Physical Exam      Airway   Mallampati: II  TM distance: >3 FB  Neck ROM: limited    Dental   (+) missing    Cardiovascular   Rhythm and rate: regular and normal      Pulmonary    breath sounds clear to auscultation                    Anesthesia Plan      History & Physical Review  History and physical reviewed and following examination; no interval change.    ASA Status:  4 .        Plan for MAC Reason for MAC:  Other - see comments  PONV prophylaxis:  Ondansetron (or other 5HT-3)       Postoperative Care  Postoperative pain management:  IV analgesics.      Consents  Anesthetic plan, risks, benefits and alternatives discussed with:  Patient, legal guardian and Parent (Mother and/or Father)..                          .

## 2017-05-23 NOTE — ANESTHESIA CARE TRANSFER NOTE
Patient: Uziel Rashid    Procedure(s):  PAIN PUMP REPLACEMENT (WITH MEDTRONIC) - Wound Class: I-Clean    Diagnosis: SPASTIC QUADRAPALEGIC CEREBRAL PALSY  Diagnosis Additional Information: No value filed.    Anesthesia Type:   No value filed.     Note:  Airway :Face Mask  Patient transferred to:PACU  Comments: Patient to PACU with O2. Vitals stable Report given to RN.       Vitals: (Last set prior to Anesthesia Care Transfer)    CRNA VITALS  5/23/2017 1254 - 5/23/2017 1332      5/23/2017             Resp Rate (set): 10                Electronically Signed By: DIMITRIOS Ribeiro CRNA  May 23, 2017  1:32 PM

## 2017-05-23 NOTE — PROGRESS NOTES
Admission medication history interview status for the 5/23/2017  admission is complete. See EPIC admission navigator for prior to admission medications     Medication history source reliability:Good    Medication history interview source(s):Caregiver    Medication history resources (including written lists, pill bottles, clinic record):Dad brought a MAR with him    Primary pharmacy.[facility]    Additional medication history information not noted on PTA med list :None    Time spent in this activity: 180 minutes    Prior to Admission medications    Medication Sig Last Dose Taking? Auth Provider   alum & mag hydroxide-simethicone (MYLANTA/MAALOX) 200-200-20 MG/5ML SUSP suspension 15 mLs by Per G Tube route 4 times daily as needed (GI Distress) More than a Month at PRN Yes Reported, Patient   Sodium Phosphates (FLEET ENEMA RE) Place rectally daily as needed (if Mini Doesn't work) More than a month at PRN Yes Reported, Patient   [place] Dextrin (EASY FIBER PO) 2 tsp. by Per G Tube route daily (with 300 ml water) 5/22/2017 at 0900 Yes Reported, Patient   ergocalciferol (DRISDOL) 8000 UNIT/ML drops 8,000 Units by Per G Tube route once a week (Monday) 5/22/2017 at Unknown time Yes Reported, Patient   sodium chloride 0.9 % neb solution Take 3 mLs by nebulization 3 times daily as needed for wheezing 5/22/2017 at PM Yes Reported, Patient   acetaminophen (TYLENOL) 32 mg/mL solution 576 mg by Per G Tube route every 4 hours as needed for fever or mild pain 5/16/2017 at 1600 Yes Reported, Patient   albuterol (2.5 MG/3ML) 0.083% neb solution Take 1 vial by nebulization every 3 hours as needed for shortness of breath / dyspnea or wheezing 5/23/2017 at AM Yes Reported, Patient   beclomethasone (QVAR) 40 MCG/ACT Inhaler Inhale 1 puff into the lungs 2 times daily 5/22/2017 at PM Yes Reported, Patient   BISACODYL PO 20 mg by Per J Tube route daily as needed (4 x 5 mg = 20 mg dose) 5/8/2017 at PRN Yes Reported, Patient   BROMOCRIPTINE  MESYLATE PO 5 mg by Gastric Tube route 3 times daily (2 x 2.5 mg = 5 mg dose) 5/23/2017 at AM Yes Reported, Patient   Cetirizine HCl 5 MG/5ML SOLN 10 mLs by Gastric Tube route At Bedtime 5/22/2017 at 2000 Yes Reported, Patient   cyproheptadine 2 MG/5ML syrup Take 4 mg by mouth 2 times daily 0900 and 1400 5/22/2017 at 1400 Yes Reported, Patient   cyproheptadine 2 MG/5ML syrup Take 6 mg by mouth At Bedtime 5/22/2017 at 2000 Yes Reported, Patient   diazepam (DIASTAT ACUDIAL) 10 MG GEL rectal kit Place 10 mg rectally once as needed for seizures More than a Month at PRN Yes Reported, Patient   docusate sodium (ENEMEEZ) 283 MG enema Place 1-2 enemas rectally daily as needed for constipation 5/22/2017 at 1530 Yes Reported, Patient   gabapentin (NEURONTIN) 250 MG/5ML solution 750 mg by Per G Tube route 2 times daily At 0900 and 1400 5/23/2017 at AM Yes Reported, Patient   gabapentin (NEURONTIN) 250 MG/5ML solution Take 900 mg by mouth At Bedtime At 2000 5/22/2017 at 2000 Yes Reported, Patient   ibuprofen (ADVIL/MOTRIN) 100 MG/5ML suspension Take 400 mg by mouth every 6 hours as needed for pain (Discomfort) More than a Month at PRN Yes Reported, Patient   ketoconazole (NIZORAL) 2 % shampoo Apply topically every 72 hours as needed for itching or irritation More than a Month at PRN Yes Reported, Patient   ACIDOPHILUS LACTOBACILLUS PO 1 tablet by Gastric Tube route 2 times daily 5/22/2017 at 2000 Yes Reported, Patient   LAMOTRIGINE  mg by Gastric Tube route 2 times daily (2 x 150 mg tablet = 300 mg dose) 5/23/2017 at AM Yes Reported, Patient   methylPREDNISolone sodium succinate (SOLU-MEDROL) 40 mg/mL Inject 40 mg into the vein daily as needed (stress dosing) More than a Month at PRN Yes Reported, Patient   LANSOPRAZOLE PO 30 mg by Gastric Tube route 2 times daily 5/22/2017 at 2000 Yes Reported, Patient   mupirocin (BACTROBAN) 2 % ointment Apply topically 2 times daily as needed Apply and gently massage into affected area  around G-Tube site  More than a Month at PRN Yes Reported, Patient   Artificial Tear Ointment (EYE LUBRICANT) OINT Place into both eyes every 3 hours as needed (Dry Eyes) More than a month at PRN Yes Reported, Patient   polyethylene glycol (MIRALAX/GLYCOLAX) powder 17 g by Per G Tube route daily (mix with liquid) 5/22/2017 at 1400 Yes Reported, Patient   prednisoLONE (ORAPRED/PRELONE) 15 MG/5ML solution 3.3 mLs by Gastric Tube route daily  5/23/2017 at AM Yes Reported, Patient   sennosides (SENOKOT) 8.8 MG/5ML syrup 17.6 mg by Gastric Tube route See Admin Instructions Twice daily until regular, then once daily, hold if loose 5/22/2017 at 1600 Yes Reported, Patient   Saline (SODIUM CHLORIDE NA) Spray 2 sprays in nostril 2 times daily 5/22/2017 at PM Yes Reported, Patient   TOPIRAMATE  mg by Gastric Tube route every morning (1.5 x 100 mg tablet = 150 mg dose) 5/23/2017 at AM Yes Reported, Patient   TOPIRAMATE  mg by Gastric Tube route At Bedtime (2 x 100 mg tablet = 200 mg dose) 5/22/2017 at 2000 Yes Reported, Patient   baclofen (LIORESAL) intraTHECAL Internal Pump by Intrathecal route continuous prn 599.2 UG/ 24 Hours Continuous Yes Reported, Patient   bisacodyl (DULCOLAX) 10 MG Suppository Place 10 mg rectally daily as needed for constipation 5/22/2017 at 1530 Yes Reported, Patient   BACLOFEN PO 20 mg by Gastric Tube route every 6 hours as needed (If Baclofen Pump Fails) More than a Month at PRN Yes Reported, Patient   guaiFENesin (ROBITUSSIN) 100 MG/5ML SYRP Take 5 mLs by mouth 4 times daily as needed for cough 5/16/2017 at PRN Yes Reported, Patient   hydrocortisone 2.5 % ointment Apply topically 2 times daily as needed for rash [place] santos a Month at PRN Yes Reported, Patient   Pediatric Multivit-Minerals-C (CENTRUM KIDS COMPLETE PO) 1 chew tab by Gastric Tube route daily 5/22/2017 at 0900 Yes Reported, Patient   nystatin (MYCOSTATIN) ointment Apply topically 2 times daily as needed (Rash) More than a  Month at PRN Yes Reported, Patient   NONFORMULARY 1.2 mLs by Gastric Tube route 3 times daily SIMETHICONE 80mcg/ml 5/22/2017 at 2000 Yes Reported, Patient   nystatin (MYCOSTATIN) 859535 UNIT/GM POWD Apply topically daily as needed 5/22/2017 at PRN Yes Reported, Patient   Budesonide (PULMICORT IN) Inhale into the lungs daily as needed More than a month at PRN Yes Reported, Patient   albuterol (PROAIR HFA/PROVENTIL HFA/VENTOLIN HFA) 108 (90 BASE) MCG/ACT Inhaler Inhale 2-4 puffs into the lungs every 4 hours as needed for shortness of breath / dyspnea or wheezing 5/22/2017 at PRN Yes Reported, Patient   prednisoLONE (ORAPRED/PRELONE) 15 MG/5ML solution Take 3.3-10 mLs by mouth 2 times daily as needed (mild to sevre stress) for 1-5 days -Red Zone of Resp Control Plan 5/22/2017 at 2000 Yes Reported, Patient   NONFORMULARY 30 mLs by Bladder Instillation route 2 times daily as needed GENTAMICIN BLADDER IRRIGATION SOLUTION  480mg/1ml 1 L Normal Saline  Cath into empty bladder first.  Instill 30 ml Gentamicin solution into bladder.  Leave in bladder until next cath. 5/22/2017 at 2000 Yes Reported, Patient

## 2017-05-25 NOTE — PROCEDURES
Name:      Uziel Rashid   :     1995  Procedure date:   May 23, 2017  Surgeon:    Jim Nye MD  Procedure:   Intrathecal Pump Battery Replacement  Preoperative Diagnosis: Spastic quadriplegic cerebral palsy,  G80.0      Anesthesia: General sedation was utilized for this procedure.     Implants: All implants were [employer] products.  The catheter kit number used was 8637-20 with serial number: [id number]. TYRX REF number: [id number] and lot number: [id number]    Description of Procedure: The procedure, indications, risks and alternatives to therapy wee discussed with the patient.  Written informed consent was obtained.  The pocket site and insertion site was marked in the preoperative area.  A prophylactic prescription was given for the patient to use for the next 10 days.  The patient was brought into the operating room and positioned supine on the operating table taking care to relieve all pressure points and place extremities in a comfortable position.  General anesthesia was induced.  The operative field was prepped and draped in normal sterile fashion.  The skin was anesthetized with Xylocaine at the appropriate level as identified fluoroscopically. An incision was then made in the right abdomen and the pump was disconnected from the catheter and then removed. The new pump was opened and primed.  The sterile saline was removed and filled with baclofen 2000mcg/ per ml  20 ml. The catheter was connected to the pump, which was set with a priming bolus of  556.5 mcg over 17 minutes.   The starting dose was programmed to 600.2mcg/ per day.  The pump was placed into the TYRX envelope and then placed into the abdominal pocket and fit nicely.  It was turned medially and an additional catheter material was coiled behind the pump.  It was secured to the abdominal fascia with Nurulon suture.  The wound was irrigated copiously with antibiotic saline solution, hemostasis obtained with bipolar cautery.  The  subcutaneous tissue was closed with interrupted inverted 2-0 and 3-0 Vicryl and the skin with Nylon.  Sterile dressings were applied and patient was transferred to the recovery room in stable condition.  Catheter length is 62.8cm.     The patient was awakened, positioned supine and brought back to the recovery area where they were be monitored by a registered nurse. Physiologic parameters were observed utilizing pulse oximetery and blood pressure checks in recovery. The patient tolerated the procedure well and no immediate complications were encountered. Post procedure instructions were given to the patient and patient/family verbalized understanding. All questions were answered.     Jim Nye MD   May 23, 2017

## 2018-02-13 ENCOUNTER — TRANSFERRED RECORDS (OUTPATIENT)
Dept: HEALTH INFORMATION MANAGEMENT | Facility: CLINIC | Age: 23
End: 2018-02-13

## 2018-02-13 LAB
ABSTRACT IFOB-NO CHARGE: NEGATIVE
CREAT SERPL-MCNC: 0.24 MG/DL (ref 0.74–1.35)
GFR SERPL CREATININE-BSD FRML MDRD: >90 ML/MIN/BSA
GLUCOSE SERPL-MCNC: 111 MG/DL (ref 70–140)
POTASSIUM SERPL-SCNC: 3.4 MMOL/L (ref 3.6–5.2)

## 2018-05-15 ENCOUNTER — TRANSFERRED RECORDS (OUTPATIENT)
Dept: HEALTH INFORMATION MANAGEMENT | Facility: CLINIC | Age: 23
End: 2018-05-15

## 2018-05-18 LAB
ALT SERPL-CCNC: 26 U/L (ref 7–55)
AST SERPL-CCNC: 20 U/L (ref 8–48)
CREAT SERPL-MCNC: 0.21 MG/DL (ref 0.74–1.35)
GFR SERPL CREATININE-BSD FRML MDRD: >90 ML/MIN/BSA
GLUCOSE SERPL-MCNC: 99 MG/DL (ref 70–140)
POTASSIUM SERPL-SCNC: 148 MMOL/L (ref 135–145)

## 2018-06-04 NOTE — OR NURSING
Bladder scanned for 289. Father did not want him str cathed at this time.  
Dressed and into w/c with assist of pts father. O2 sats 93-96%.   
pts dad called back re bringing Uziel tomorrow, his special needs, + concerns  
normal

## 2018-10-22 ENCOUNTER — TRANSFERRED RECORDS (OUTPATIENT)
Dept: HEALTH INFORMATION MANAGEMENT | Facility: CLINIC | Age: 23
End: 2018-10-22

## 2018-11-06 ENCOUNTER — TELEPHONE (OUTPATIENT)
Dept: GASTROENTEROLOGY | Facility: CLINIC | Age: 23
End: 2018-11-06

## 2018-11-06 ENCOUNTER — TRANSFERRED RECORDS (OUTPATIENT)
Dept: HEALTH INFORMATION MANAGEMENT | Facility: CLINIC | Age: 23
End: 2018-11-06

## 2018-11-06 NOTE — TELEPHONE ENCOUNTER
[facility] Call Center    Phone Message    May a detailed message be left on voicemail: yes    Reason for Call: Other: patient has CDiff and is in the [place] system but has a chart within Epic. They are wanting to have him seen at [place].  Dad is having a referral sent over from patient's [place] provider. Please advise.      Action Taken: Message routed to:  Adult Clinics: Gastroenterology (GI) p [id number]

## 2018-11-06 NOTE — TELEPHONE ENCOUNTER
Called and spoke to dad, Pranay (he states that patient is disabled).  Advised him to be sure to have all medical records pertaining to C Diff also sent with referral.  Raven states understanding.      Carol Westholter

## 2018-11-09 NOTE — TELEPHONE ENCOUNTER
Records received and reviewed.  Additional C Diff tests are also in Care Everywhere.  Appointment scheduled for 11/21/18 at 3:00pm.     Carol Westholter

## (undated) DEVICE — NDL 19GA 1.5"

## (undated) DEVICE — GLOVE PROTEXIS MICRO 8.0  2D73PM80

## (undated) DEVICE — PACK MINOR SBA15MIFSE

## (undated) DEVICE — SUCTION CANISTER MEDIVAC LINER 3000ML W/LID 65651-530

## (undated) DEVICE — DRAPE SHEET REV FOLD 3/4 9349

## (undated) DEVICE — SYR EAR BULB 3OZ 0035830

## (undated) DEVICE — SU SILK 2-0 SH CR 8X18" C012D

## (undated) DEVICE — GLOVE PROTEXIS W/NEU-THERA 8.0  2D73TE80

## (undated) DEVICE — DRAPE STERI TOWEL LG 1010

## (undated) DEVICE — DRAPE COVER C-ARM SEAMLESS SNAP-KAP 03-KP26 LATEX FREE

## (undated) DEVICE — Device

## (undated) DEVICE — SYR 07ML EPIDURAL LOSS OF RESISTANCE PULSATOR 4905

## (undated) DEVICE — SU VICRYL 0 CT-2 CR 8X18" J727D

## (undated) DEVICE — DRAPE IOBAN INCISE 23X17" 6650EZ

## (undated) DEVICE — DRSG TELFA ISLAND 4X8" 7541

## (undated) DEVICE — PACK UNIVERSAL SPLIT 29131

## (undated) DEVICE — LINEN TOWEL PACK X5 5464

## (undated) DEVICE — SU VICRYL 4-0 PS-2 18" UND J496H

## (undated) DEVICE — GLOVE PROTEXIS POWDER FREE 7.0 ORTHOPEDIC 2D73ET70

## (undated) DEVICE — DRSG KERLIX FLUFFS X5

## (undated) DEVICE — PREP DURAPREP 26ML APL 8630

## (undated) RX ORDER — FENTANYL CITRATE 50 UG/ML
INJECTION, SOLUTION INTRAMUSCULAR; INTRAVENOUS
Status: DISPENSED
Start: 2017-05-23

## (undated) RX ORDER — LIDOCAINE HYDROCHLORIDE AND EPINEPHRINE 10; 10 MG/ML; UG/ML
INJECTION, SOLUTION INFILTRATION; PERINEURAL
Status: DISPENSED
Start: 2017-05-23

## (undated) RX ORDER — DEXAMETHASONE SODIUM PHOSPHATE 4 MG/ML
INJECTION, SOLUTION INTRA-ARTICULAR; INTRALESIONAL; INTRAMUSCULAR; INTRAVENOUS; SOFT TISSUE
Status: DISPENSED
Start: 2017-05-23

## (undated) RX ORDER — LIDOCAINE HYDROCHLORIDE 10 MG/ML
INJECTION, SOLUTION EPIDURAL; INFILTRATION; INTRACAUDAL; PERINEURAL
Status: DISPENSED
Start: 2017-05-23

## (undated) RX ORDER — ACETAMINOPHEN 10 MG/ML
INJECTION, SOLUTION INTRAVENOUS
Status: DISPENSED
Start: 2017-05-23